# Patient Record
Sex: FEMALE | Race: WHITE | Employment: PART TIME | URBAN - NONMETROPOLITAN AREA
[De-identification: names, ages, dates, MRNs, and addresses within clinical notes are randomized per-mention and may not be internally consistent; named-entity substitution may affect disease eponyms.]

---

## 2018-01-25 ENCOUNTER — OFFICE VISIT (OUTPATIENT)
Dept: ENT CLINIC | Age: 63
End: 2018-01-25
Payer: COMMERCIAL

## 2018-01-25 VITALS
TEMPERATURE: 97.8 F | BODY MASS INDEX: 27.49 KG/M2 | RESPIRATION RATE: 20 BRPM | SYSTOLIC BLOOD PRESSURE: 110 MMHG | DIASTOLIC BLOOD PRESSURE: 72 MMHG | WEIGHT: 149.4 LBS | HEART RATE: 76 BPM | HEIGHT: 62 IN

## 2018-01-25 DIAGNOSIS — R09.81 NASAL CONGESTION: ICD-10-CM

## 2018-01-25 DIAGNOSIS — Z98.890 S/P SINUS SURGERY: ICD-10-CM

## 2018-01-25 DIAGNOSIS — R44.8 FACIAL PRESSURE: Primary | ICD-10-CM

## 2018-01-25 DIAGNOSIS — J32.4 CHRONIC PANSINUSITIS: ICD-10-CM

## 2018-01-25 DIAGNOSIS — R51.9 PERSISTENT HEADACHES: ICD-10-CM

## 2018-01-25 PROCEDURE — 99203 OFFICE O/P NEW LOW 30 MIN: CPT | Performed by: PHYSICIAN ASSISTANT

## 2018-01-25 RX ORDER — AMOXICILLIN AND CLAVULANATE POTASSIUM 875; 125 MG/1; MG/1
1 TABLET, FILM COATED ORAL 2 TIMES DAILY
Qty: 56 TABLET | Refills: 0 | Status: SHIPPED | OUTPATIENT
Start: 2018-01-25 | End: 2018-02-20 | Stop reason: ALTCHOICE

## 2018-01-25 ASSESSMENT — ENCOUNTER SYMPTOMS
EYE PAIN: 1
TROUBLE SWALLOWING: 1
VOMITING: 0
NAUSEA: 1
ABDOMINAL PAIN: 1
WHEEZING: 0
COUGH: 1
STRIDOR: 0
EYE REDNESS: 1
EYE DISCHARGE: 0
RHINORRHEA: 1
ABDOMINAL DISTENTION: 1
VOICE CHANGE: 0
SINUS PRESSURE: 1
CONSTIPATION: 0
CHEST TIGHTNESS: 0
COLOR CHANGE: 0
DIARRHEA: 1
FACIAL SWELLING: 1
EYE ITCHING: 1
CHOKING: 0
BACK PAIN: 1
SORE THROAT: 1
PHOTOPHOBIA: 1
SHORTNESS OF BREATH: 0
BLOOD IN STOOL: 0
RECTAL PAIN: 0
ANAL BLEEDING: 0
APNEA: 0

## 2018-01-25 NOTE — PROGRESS NOTES
[facility]  [facility]  [facility]  [address] [place] [address] [address] [address]  Dept: [phone]  Dept Fax: [phone]  Loc: [phone]      Dina Villaseñor is a 58 y.o. female who was referred by Jose Bermudez CNP for:  Chief Complaint   Patient presents with    Sinusitis     New patient here for acrute sinus infection. Referred by [facility].    . HPI:     Rolf Bojorquez presents to the clinic with complaints of recurrent sinus infections. She states that she gets 4-5 infections a year. She states that she has had this problem her entire life. She states that she has had 3 sinus surgeries in her lifetime, the last one being in the 1990s. Most recently she has had increasing purulent drainage and facial pressure primarily between her eyes and on her left maxillary side. She has been on over 5 antibiotics in the last 6 moths for this all without any significant relief. She does use Flonase for nasal congestion which does help some. She complains of ongoing headaches which she attributes to her sinuses. She states there are times that she has nasal obstruction but she has learned to breath through her mouth effectively.        Patient Active Problem List   Diagnosis    Diarrhea    Heart burn    Abdominal cramping    History of breast cancer    Dysphagia    Occult blood in stools    Adenomatous polyps     Allergies   Allergen Reactions    Sulfa Antibiotics Hives    Tolectin [Tolmetin Sodium] Hives    Codeine Nausea And Vomiting     Past Medical History:   Diagnosis Date    Breast cancer ([address]     rt    Breast cancer ([address]     Colonoscopy causing post-procedural bleeding 20 years ago    Depression     Dizzy spells     GERD (gastroesophageal reflux disease)     Glaucoma     Headache(784.0)     Hypothyroidism     Night sweats     Osteoarthritis     Sexual problems     Sinus infection     Stroke ([address]     Urinary incontinence      Past Surgical History:   Procedure Laterality Date    BREAST LUMPECTOMY  2/6/05    rt breast    CHOLECYSTECTOMY      COLONOSCOPY      ENDOSCOPY, COLON, DIAGNOSTIC      GALLBLADDER SURGERY      SINUS SURGERY      TONSILLECTOMY      UPPER GASTROINTESTINAL ENDOSCOPY           Subjective:      Review of Systems   Constitutional: Positive for appetite change, chills, diaphoresis and fatigue. Negative for activity change, fever and unexpected weight change. HENT: Positive for dental problem, ear pain, facial swelling, hearing loss, nosebleeds, postnasal drip, rhinorrhea, sinus pressure, sneezing, sore throat and trouble swallowing. Negative for congestion, drooling, ear discharge, mouth sores, tinnitus and voice change. Eyes: Positive for photophobia, pain, redness, itching and visual disturbance. Negative for discharge. Respiratory: Positive for cough. Negative for apnea, choking, chest tightness, shortness of breath, wheezing and stridor. Cardiovascular: Negative for chest pain, palpitations and leg swelling. Gastrointestinal: Positive for abdominal distention, abdominal pain, diarrhea and nausea. Negative for anal bleeding, blood in stool, constipation, rectal pain and vomiting. Endocrine: Positive for heat intolerance, polydipsia and polyuria. Negative for cold intolerance and polyphagia. Genitourinary: Positive for enuresis and frequency. Negative for decreased urine volume, difficulty urinating, dyspareunia, dysuria, flank pain, genital sores, hematuria, menstrual problem, pelvic pain, urgency, vaginal bleeding, vaginal discharge and vaginal pain. Musculoskeletal: Positive for arthralgias, back pain, myalgias, neck pain and neck stiffness. Negative for gait problem and joint swelling. Skin: Negative for color change, pallor, rash and wound. Allergic/Immunologic: Negative for environmental allergies, food allergies and immunocompromised state.    Neurological: Positive for dizziness, light-headedness, numbness and

## 2018-02-19 ENCOUNTER — HOSPITAL ENCOUNTER (OUTPATIENT)
Dept: CT IMAGING | Age: 63
Discharge: HOME | End: 2018-02-19
Payer: COMMERCIAL

## 2018-02-19 DIAGNOSIS — Z98.890 S/P SINUS SURGERY: ICD-10-CM

## 2018-02-19 DIAGNOSIS — R09.81 NASAL CONGESTION: ICD-10-CM

## 2018-02-19 DIAGNOSIS — J32.4 CHRONIC PANSINUSITIS: ICD-10-CM

## 2018-02-19 DIAGNOSIS — R44.8 FACIAL PRESSURE: ICD-10-CM

## 2018-02-19 PROCEDURE — 70486 CT MAXILLOFACIAL W/O DYE: CPT

## 2018-02-20 ENCOUNTER — TELEPHONE (OUTPATIENT)
Dept: ENT CLINIC | Age: 63
End: 2018-02-20

## 2018-02-20 ENCOUNTER — OFFICE VISIT (OUTPATIENT)
Dept: ENT CLINIC | Age: 63
End: 2018-02-20
Payer: COMMERCIAL

## 2018-02-20 VITALS
DIASTOLIC BLOOD PRESSURE: 64 MMHG | HEART RATE: 72 BPM | WEIGHT: 149.9 LBS | SYSTOLIC BLOOD PRESSURE: 108 MMHG | BODY MASS INDEX: 27.58 KG/M2 | TEMPERATURE: 98 F | HEIGHT: 62 IN | RESPIRATION RATE: 16 BRPM

## 2018-02-20 DIAGNOSIS — J32.0 CHRONIC MAXILLARY SINUSITIS: ICD-10-CM

## 2018-02-20 DIAGNOSIS — J34.89 EMPTY NOSE SYNDROME: Primary | ICD-10-CM

## 2018-02-20 DIAGNOSIS — R44.8 FACIAL PRESSURE: ICD-10-CM

## 2018-02-20 DIAGNOSIS — H92.03 REFERRED OTALGIA OF BOTH EARS: ICD-10-CM

## 2018-02-20 DIAGNOSIS — K21.9 GASTROESOPHAGEAL REFLUX DISEASE WITHOUT ESOPHAGITIS: ICD-10-CM

## 2018-02-20 DIAGNOSIS — R09.81 NASAL CONGESTION: ICD-10-CM

## 2018-02-20 DIAGNOSIS — G90.01 CAROTIDYNIA: ICD-10-CM

## 2018-02-20 DIAGNOSIS — R51.9 PERSISTENT HEADACHES: ICD-10-CM

## 2018-02-20 DIAGNOSIS — Z98.890 S/P SINUS SURGERY: ICD-10-CM

## 2018-02-20 DIAGNOSIS — M26.629 TMJ SYNDROME: ICD-10-CM

## 2018-02-20 PROCEDURE — 99213 OFFICE O/P EST LOW 20 MIN: CPT | Performed by: OTOLARYNGOLOGY

## 2018-02-20 RX ORDER — NAPROXEN 500 MG/1
500 TABLET ORAL 2 TIMES DAILY WITH MEALS
Qty: 60 TABLET | Refills: 3 | Status: SHIPPED | OUTPATIENT
Start: 2018-02-20 | End: 2020-06-23 | Stop reason: ALTCHOICE

## 2018-02-20 ASSESSMENT — ENCOUNTER SYMPTOMS
SHORTNESS OF BREATH: 0
SORE THROAT: 0
VOMITING: 0
NAUSEA: 0
COUGH: 0
DIARRHEA: 0
VOICE CHANGE: 0
COLOR CHANGE: 0
TROUBLE SWALLOWING: 0
ABDOMINAL PAIN: 0
CHEST TIGHTNESS: 0
STRIDOR: 0
APNEA: 0
WHEEZING: 0
FACIAL SWELLING: 0
CHOKING: 0
SINUS PRESSURE: 0
RHINORRHEA: 0

## 2018-02-20 NOTE — TELEPHONE ENCOUNTER
Dr. Lucretia Cbob wanted to know if patient has ever had an allergy to aspirin or any other NSAID. Called patient and she stated that she hasn't had an allergy to aspirin or any other NSAID,. Informed Dr. Lucretia Cobb and he sent in naproxen for patient.

## 2018-02-20 NOTE — PROGRESS NOTES
nosebleeds, postnasal drip, rhinorrhea, sinus pressure, sneezing, sore throat, tinnitus, trouble swallowing and voice change. Eyes: Negative for visual disturbance. Respiratory: Negative for apnea, cough, choking, chest tightness, shortness of breath, wheezing and stridor. Cardiovascular: Negative for chest pain, palpitations and leg swelling. Gastrointestinal: Negative for abdominal pain, diarrhea, nausea and vomiting. Endocrine: Negative for cold intolerance, heat intolerance, polydipsia and polyuria. Genitourinary: Negative for dysuria, enuresis and hematuria. Musculoskeletal: Negative for arthralgias, gait problem, neck pain and neck stiffness. Skin: Negative for color change and rash. Allergic/Immunologic: Negative for environmental allergies, food allergies and immunocompromised state. Neurological: Negative for dizziness, syncope, facial asymmetry, speech difficulty, light-headedness and headaches. Hematological: Negative for adenopathy. Does not bruise/bleed easily. Psychiatric/Behavioral: Negative for confusion and sleep disturbance. The patient is not nervous/anxious. Objective:     /64 (Site: Left Arm, Position: Sitting)   Pulse 72   Temp 98 °F (36.7 °C) (Oral)   Resp 16   Ht 5' 2\" (1.575 m)   Wt 149 lb 14.4 oz (68 kg)   BMI 27.42 kg/m²     Physical Exam   Constitutional: She is oriented to person, place, and time. She appears well-developed and well-nourished. She is cooperative. HENT:   Head: Normocephalic and atraumatic. Head is without laceration. Right Ear: Hearing, tympanic membrane, external ear and ear canal normal. No drainage or swelling. Tympanic membrane is not scarred, not perforated and not erythematous. Tympanic membrane mobility is normal (on pneumatic massage). No middle ear effusion. Left Ear: Hearing, tympanic membrane, external ear and ear canal normal. No drainage or swelling.  Tympanic membrane is not scarred, not perforated and not erythematous. Tympanic membrane mobility is normal (on pneumatic massage). No middle ear effusion. Nose: Nose normal. No mucosal edema, rhinorrhea or septal deviation. Mouth/Throat: Uvula is midline, oropharynx is clear and moist and mucous membranes are normal. Mucous membranes are not pale and not dry. No oral lesions. No uvula swelling. No oropharyngeal exudate, posterior oropharyngeal edema or posterior oropharyngeal erythema. Turbinates: normal  LIps: lips normal    Teeth and gums: [place] Incisors present and the rest of been removed. Temporomandibular joints reveal no excursion on either side when opening her mouth. Significant pain to palpation right TMJ. Mallampati 1  Tonsils:Unremarkable  Base of tongue: symmetric,  Lingual tonsils: healthy  Valleculae unremarkable  Pharyngeal walls and pyriform sinuses: No lesions or pooling  Larynx, mirror exam: Supraglottis, false and true vocal cord were normal with normal mobility Arytenoid erythema: Present  Arytenoid edema: Absent        Eyes: Right eye exhibits normal extraocular motion and no nystagmus. Left eye exhibits normal extraocular motion and no nystagmus. Conjugate gaze   Neck: Trachea normal and phonation normal. Neck supple. No spinous process tenderness present. No tracheal deviation present. No thyroid mass and no thyromegaly present. No adenopathy. Salivary glands not enlarged and normal to palpation     Cardiovascular:   No murmur heard. Pulmonary/Chest: Breath sounds normal. No stridor. Neurological: She is alert and oriented to person, place, and time. No cranial nerve deficit (VIIth N function intact bilat). Psychiatric: She has a normal mood and affect. Nursing note and vitals reviewed. Data:  All of the past medical history, past surgical history, family history, social history, allergies and current medications were reviewed with the patient.      Assessment & Plan   Diagnoses and all orders for this

## 2018-05-01 ENCOUNTER — OFFICE VISIT (OUTPATIENT)
Dept: ENT CLINIC | Age: 63
End: 2018-05-01
Payer: COMMERCIAL

## 2018-05-01 VITALS
SYSTOLIC BLOOD PRESSURE: 100 MMHG | HEIGHT: 62 IN | WEIGHT: 149.9 LBS | HEART RATE: 84 BPM | TEMPERATURE: 97.8 F | BODY MASS INDEX: 27.58 KG/M2 | RESPIRATION RATE: 12 BRPM | DIASTOLIC BLOOD PRESSURE: 60 MMHG

## 2018-05-01 DIAGNOSIS — K21.9 GASTROESOPHAGEAL REFLUX DISEASE WITHOUT ESOPHAGITIS: Primary | ICD-10-CM

## 2018-05-01 DIAGNOSIS — M26.629 TMJ SYNDROME: ICD-10-CM

## 2018-05-01 DIAGNOSIS — J34.89 EMPTY NOSE SYNDROME: ICD-10-CM

## 2018-05-01 DIAGNOSIS — G90.01 CAROTIDYNIA: ICD-10-CM

## 2018-05-01 PROCEDURE — 99213 OFFICE O/P EST LOW 20 MIN: CPT | Performed by: OTOLARYNGOLOGY

## 2018-05-01 PROCEDURE — 31575 DIAGNOSTIC LARYNGOSCOPY: CPT | Performed by: OTOLARYNGOLOGY

## 2018-05-01 ASSESSMENT — ENCOUNTER SYMPTOMS
DIARRHEA: 0
NAUSEA: 0
SINUS PRESSURE: 0
RHINORRHEA: 0
ABDOMINAL PAIN: 0
WHEEZING: 0
SORE THROAT: 0
STRIDOR: 0
APNEA: 0
TROUBLE SWALLOWING: 0
SHORTNESS OF BREATH: 0
CHOKING: 0
CHEST TIGHTNESS: 0
COUGH: 0
COLOR CHANGE: 0
FACIAL SWELLING: 0
VOICE CHANGE: 0
VOMITING: 0

## 2018-05-22 ENCOUNTER — HOSPITAL ENCOUNTER (OUTPATIENT)
Dept: CT IMAGING | Age: 63
Discharge: HOME | End: 2018-05-22
Payer: COMMERCIAL

## 2018-05-22 DIAGNOSIS — R10.819 LOWER ABDOMINAL TENDERNESS: ICD-10-CM

## 2018-05-22 DIAGNOSIS — R10.31 RLQ ABDOMINAL PAIN: ICD-10-CM

## 2018-05-22 DIAGNOSIS — Z87.898 HISTORY OF DIARRHEA: ICD-10-CM

## 2018-05-22 LAB — POC CREATININE WHOLE BLOOD: 0.7 MG/DL (ref 0.5–1.2)

## 2018-05-22 PROCEDURE — 6360000004 HC RX CONTRAST MEDICATION: Performed by: NURSE PRACTITIONER

## 2018-05-22 PROCEDURE — 74177 CT ABD & PELVIS W/CONTRAST: CPT

## 2018-05-22 PROCEDURE — 82565 ASSAY OF CREATININE: CPT

## 2018-05-22 RX ADMIN — IOHEXOL 50 ML: 240 INJECTION, SOLUTION INTRATHECAL; INTRAVASCULAR; INTRAVENOUS; ORAL at 08:23

## 2018-05-22 RX ADMIN — IOPAMIDOL 85 ML: 755 INJECTION, SOLUTION INTRAVENOUS at 08:24

## 2018-12-17 ENCOUNTER — HOSPITAL ENCOUNTER (OUTPATIENT)
Dept: WOMENS IMAGING | Age: 63
Discharge: HOME | End: 2018-12-17
Payer: COMMERCIAL

## 2018-12-17 ENCOUNTER — HOSPITAL ENCOUNTER (OUTPATIENT)
Dept: MAMMOGRAPHY | Age: 63
Discharge: HOME | End: 2018-12-17
Payer: COMMERCIAL

## 2018-12-17 DIAGNOSIS — Z12.31 VISIT FOR SCREENING MAMMOGRAM: ICD-10-CM

## 2018-12-17 PROCEDURE — 3209999900 MAM COMPARISON OF OUTSIDE IMAGES

## 2018-12-17 PROCEDURE — 77067 SCR MAMMO BI INCL CAD: CPT

## 2020-07-20 ENCOUNTER — HOSPITAL ENCOUNTER (OUTPATIENT)
Age: 65
Discharge: HOME | End: 2020-07-20
Payer: COMMERCIAL

## 2020-07-20 PROCEDURE — U0002 COVID-19 LAB TEST NON-CDC: HCPCS

## 2020-07-22 LAB
PERFORMING LAB: NORMAL
REPORT: NORMAL
SARS-COV-2: NOT DETECTED

## 2021-02-15 ENCOUNTER — TELEPHONE (OUTPATIENT)
Dept: ENT CLINIC | Age: 66
End: 2021-02-15

## 2021-02-15 NOTE — TELEPHONE ENCOUNTER
The patient called to reschedule 2/16/21 appointment to 2/23/21 due to the weather. Patient c/o ear pain, ear burning. She was seen previously by Dr. Jamia Foster 5/01/2018.

## 2021-02-23 ENCOUNTER — OFFICE VISIT (OUTPATIENT)
Dept: ENT CLINIC | Age: 66
End: 2021-02-23
Payer: MEDICARE

## 2021-02-23 VITALS
SYSTOLIC BLOOD PRESSURE: 102 MMHG | TEMPERATURE: 98.4 F | BODY MASS INDEX: 28.71 KG/M2 | HEIGHT: 62 IN | DIASTOLIC BLOOD PRESSURE: 68 MMHG | HEART RATE: 76 BPM | WEIGHT: 156 LBS | RESPIRATION RATE: 14 BRPM

## 2021-02-23 DIAGNOSIS — J34.89 EMPTY NOSE SYNDROME: ICD-10-CM

## 2021-02-23 DIAGNOSIS — H92.01 REFERRED OTALGIA OF RIGHT EAR: ICD-10-CM

## 2021-02-23 DIAGNOSIS — M26.621 ARTHRALGIA OF RIGHT TEMPOROMANDIBULAR JOINT: ICD-10-CM

## 2021-02-23 DIAGNOSIS — A88.1 EPIDEMIC VERTIGO: ICD-10-CM

## 2021-02-23 PROCEDURE — 99214 OFFICE O/P EST MOD 30 MIN: CPT | Performed by: OTOLARYNGOLOGY

## 2021-02-23 ASSESSMENT — ENCOUNTER SYMPTOMS
COUGH: 0
SINUS PAIN: 1
SHORTNESS OF BREATH: 0
CHEST TIGHTNESS: 0
TROUBLE SWALLOWING: 0
DIARRHEA: 0
RHINORRHEA: 0
COLOR CHANGE: 0
SORE THROAT: 0
NAUSEA: 0
STRIDOR: 0
SINUS PRESSURE: 1
APNEA: 0
VOMITING: 0
VOICE CHANGE: 0
WHEEZING: 0
CHOKING: 0
FACIAL SWELLING: 0
ABDOMINAL PAIN: 0

## 2021-02-23 NOTE — PROGRESS NOTES
[facility] [place] [facility]  [facility]  [facility] [place]  Dept: [phone]  Dept Fax: [phone]  Loc: [phone]    Shanell Fisher is a 72 y.o. female who was referred byNo ref. provider found for:  Chief Complaint   Patient presents with    Follow-up     Patient is here for right ear and sinus issues. Vonzella Goodpasture HPI:     Shanell Fisher is a 72 y.o. female who presents today for an episode of vertigo lasting about three days, 13 months ago. She had some tracking issues with her eyes for an hour last month. NO other issues with balance. Right ear pain. Crusting in nose. Her hearing in her right ear is so poor, she cannot hear on the phone. She has had sinus surgery and has a chronic stuffy nose. History: Allergies   Allergen Reactions    Sulfa Antibiotics Hives    Tolectin [Tolmetin Sodium] Hives    Codeine Nausea And Vomiting     Current Outpatient Medications   Medication Sig Dispense Refill    pantoprazole (PROTONIX) 40 MG tablet Take 40 mg by mouth daily      levothyroxine (SYNTHROID) 50 MCG tablet Take 50 mcg by mouth Daily      meclizine (ANTIVERT) 12.5 MG tablet Take by mouth      Progesterone 40 % CREA APPLY 0.5ML TOPICALLY TO THE INNER WRIST/ UPPER BUTTOCK TWICE DAILY      acetaminophen (TYLENOL) 325 MG tablet Take 650 mg by mouth every 6 hours as needed. No current facility-administered medications for this visit.       Past Medical History:   Diagnosis Date    [facility]     rt    [facility] ([facility]     Colonoscopy causing post-procedural bleeding 20 years ago    Depression     Dizzy spells     GERD (gastroesophageal reflux disease)     Glaucoma     Headache(784.0)     Hypothyroidism     Night sweats     Osteoarthritis     Sexual problems     Sinus infection     Stroke ([facility]     Urinary incontinence       Past Surgical History:   Procedure Laterality Date    BREAST LUMPECTOMY  2/6/05    rt breast    CHOLECYSTECTOMY      COLONOSCOPY      COLONOSCOPY  07/02/2018    EGD  07/02/2018    ENDOSCOPY, COLON, DIAGNOSTIC  07/23/2020    GALLBLADDER SURGERY      SINUS SURGERY      TONSILLECTOMY      UPPER GASTROINTESTINAL ENDOSCOPY  2017    polyps removed (16 removed) some were precancerous     Family History   Problem Relation Age of Onset    Diabetes Mother     Heart Disease Mother     Kidney Disease Mother     Other Father         gunshot    Cancer Sister 48        pancreatic    Breast Cancer Sister 46    Colon Cancer Neg Hx      Social History     Tobacco Use    Smoking status: Never Smoker    Smokeless tobacco: Never Used   Substance Use Topics    Alcohol use: No     Alcohol/week: 0.0 standard drinks       Subjective:      Review of Systems   Constitutional: Negative for activity change, appetite change, chills, diaphoresis, fatigue, fever and unexpected weight change. HENT: Positive for ear pain, sinus pressure and sinus pain. Negative for congestion, dental problem, ear discharge, facial swelling, hearing loss, mouth sores, nosebleeds, postnasal drip, rhinorrhea, sneezing, sore throat, tinnitus, trouble swallowing and voice change. Eyes: Negative for visual disturbance. Respiratory: Negative for apnea, cough, choking, chest tightness, shortness of breath, wheezing and stridor. Cardiovascular: Negative for chest pain, palpitations and leg swelling. Gastrointestinal: Negative for abdominal pain, diarrhea, nausea and vomiting. Endocrine: Negative for cold intolerance, heat intolerance, polydipsia and polyuria. Genitourinary: Negative for dysuria, enuresis and hematuria. Musculoskeletal: Negative for arthralgias, gait problem, neck pain and neck stiffness. Skin: Negative for color change and rash. Allergic/Immunologic: Negative for environmental allergies, food allergies and immunocompromised state.    Neurological: Negative for dizziness, syncope, facial asymmetry, speech difficulty, light-headedness and headaches. Hematological: Negative for adenopathy. Does not bruise/bleed easily. Psychiatric/Behavioral: Negative for confusion and sleep disturbance. The patient is not nervous/anxious. Objective:   /68 (Site: Left Upper Arm, Position: Sitting)   Pulse 76   Temp 98.4 °F (36.9 °C) (Infrared)   Resp 14   Ht 5' 2\" (1.575 m)   Wt 156 lb (70.8 kg)   BMI 28.53 kg/m²     Physical Exam  Vitals signs and nursing note reviewed. Constitutional:       Appearance: She is well-developed. HENT:      Head: Normocephalic and atraumatic. No laceration. Comments:        Right Ear: Hearing, ear canal and external ear normal. No drainage or swelling. No middle ear effusion. Tympanic membrane is not perforated or erythematous. Left Ear: Hearing, tympanic membrane, ear canal and external ear normal. No drainage or swelling. No middle ear effusion. Tympanic membrane is not perforated or erythematous. Nose: Nose normal. No septal deviation, mucosal edema or rhinorrhea. Comments: [place] patent nasal fossa, surgically absent turbinates     Mouth/Throat:      Mouth: Mucous membranes are not pale and not dry. No oral lesions. Pharynx: Oropharynx is clear. Uvula midline. No oropharyngeal exudate or posterior oropharyngeal erythema. Comments: LIps: lips normal     Mallampati 1  Base of tongue: symmetric,  Eyes:      Extraocular Movements:      Right eye: Normal extraocular motion and no nystagmus. Left eye: Normal extraocular motion and no nystagmus. Comments: Conjugate gaze   Neck:      Musculoskeletal: Neck supple. Thyroid: No thyroid mass or thyromegaly. Trachea: Phonation normal. No tracheal deviation. Comments:   Salivary glands not enlarged and normal to palpation    Pulmonary:      Effort: Pulmonary effort is normal. No retractions. Breath sounds: No stridor.    Neurological:      Mental Status: She is alert and oriented to person, place, and time. Cranial Nerves: No cranial nerve deficit (VIIth N function intact bilat). Psychiatric:         Mood and Affect: Mood and affect normal.         Behavior: Behavior is cooperative. Data:  All of the past medical history, past surgical history, family history,social history, allergies and current medications were reviewed with the patient. Assessment & Plan   Diagnoses and all orders for this visit:     Diagnosis Orders   1. Asymmetrical hearing loss of right ear  Audiometry with tympanometry   2. Epidemic vertigo  Audiometry with tympanometry   3. Arthralgia of right temporomandibular joint     4. Referred otalgia of right ear     5. Empty nose syndrome         The findings were explained and her questions were answered. We will get hearing test next available opportunity and review that before ordering an MRI of her posterior fossa and IACs with contrast.    Recommend buffered saline irrigations with the [employer] system, with nasal gel throughout the day to minimize dryness. Return for Audiol review. Bertha Monday. Orlando Jones MD    **This report has been created using voice recognition software. It may contain minor errors which are inherent in voicerecognition technology. **

## 2021-04-06 ENCOUNTER — OFFICE VISIT (OUTPATIENT)
Dept: ENT CLINIC | Age: 66
End: 2021-04-06
Payer: MEDICARE

## 2021-04-06 ENCOUNTER — HOSPITAL ENCOUNTER (OUTPATIENT)
Dept: AUDIOLOGY | Age: 66
Discharge: HOME | End: 2021-04-06
Payer: MEDICARE

## 2021-04-06 VITALS
WEIGHT: 155 LBS | TEMPERATURE: 97.2 F | SYSTOLIC BLOOD PRESSURE: 110 MMHG | RESPIRATION RATE: 14 BRPM | BODY MASS INDEX: 28.35 KG/M2 | HEART RATE: 70 BPM | DIASTOLIC BLOOD PRESSURE: 76 MMHG

## 2021-04-06 DIAGNOSIS — H81.90 EPISODIC RECURRENT VERTIGO: ICD-10-CM

## 2021-04-06 PROCEDURE — 92567 TYMPANOMETRY: CPT | Performed by: AUDIOLOGIST

## 2021-04-06 PROCEDURE — 99214 OFFICE O/P EST MOD 30 MIN: CPT | Performed by: OTOLARYNGOLOGY

## 2021-04-06 PROCEDURE — 92557 COMPREHENSIVE HEARING TEST: CPT | Performed by: AUDIOLOGIST

## 2021-04-06 RX ORDER — BETAMETHASONE DIPROPIONATE 0.5 MG/G
CREAM TOPICAL
COMMUNITY
Start: 2021-03-22 | End: 2021-11-30 | Stop reason: ALTCHOICE

## 2021-04-06 ASSESSMENT — ENCOUNTER SYMPTOMS
SHORTNESS OF BREATH: 0
WHEEZING: 0
RHINORRHEA: 0
FACIAL SWELLING: 0
VOICE CHANGE: 0
DIARRHEA: 0
SINUS PRESSURE: 0
NAUSEA: 0
SORE THROAT: 0
CHOKING: 0
TROUBLE SWALLOWING: 0
STRIDOR: 0
CHEST TIGHTNESS: 0
COLOR CHANGE: 0
COUGH: 0
APNEA: 0
ABDOMINAL PAIN: 0
VOMITING: 0

## 2021-04-06 NOTE — PROGRESS NOTES
[facility] [place] [facility]  [facility]  [facility] [place]  Dept: [phone]  Dept Fax: [phone]  Loc: [phone]    Blair Jordan is a 77 y.o. female who was referred byNo ref. provider found for:  Chief Complaint   Patient presents with    Follow-up     Patient is here for same day audio and results   . HPI:     Blair Jordan is a 77 y.o. female who presents today for follow-up on her audiogram.  Middle ear pressures were normal but there was reduced compliance in the left ear. There is high-frequency loss in the left ear that is unexplained. Word discrimination is somewhat impaired on the left side compared to the right. She has episodic vertigo    History: Allergies   Allergen Reactions    Sulfa Antibiotics Hives    Tolectin [Tolmetin Sodium] Hives    Codeine Nausea And Vomiting     Current Outpatient Medications   Medication Sig Dispense Refill    betamethasone dipropionate (DIPROLENE) 0.05 % cream APPLY TOPICALLY ONCE DAILY FOR 14 DAYS      meclizine (ANTIVERT) 12.5 MG tablet Take by mouth      pantoprazole (PROTONIX) 40 MG tablet Take 40 mg by mouth daily      Progesterone 40 % CREA APPLY 0.5ML TOPICALLY TO THE INNER WRIST/ UPPER BUTTOCK TWICE DAILY      levothyroxine (SYNTHROID) 50 MCG tablet Take 50 mcg by mouth Daily      acetaminophen (TYLENOL) 325 MG tablet Take 650 mg by mouth every 6 hours as needed. No current facility-administered medications for this visit.       Past Medical History:   Diagnosis Date    [facility] [address] ([facility]     rt    Breast cancer ([facility]     Colonoscopy causing post-procedural bleeding 20 years ago    Depression     Dizzy spells     GERD (gastroesophageal reflux disease)     Glaucoma     Headache(784.0)     Hypothyroidism     Night sweats     Osteoarthritis     Sexual problems     Sinus infection     Stroke ([facility]     Urinary incontinence       Past Surgical History:   Procedure Laterality Date    BREAST LUMPECTOMY  2/6/05    rt breast    CHOLECYSTECTOMY      COLONOSCOPY      COLONOSCOPY  07/02/2018    EGD  07/02/2018    ENDOSCOPY, COLON, DIAGNOSTIC  07/23/2020    GALLBLADDER SURGERY      SINUS SURGERY      TONSILLECTOMY      UPPER GASTROINTESTINAL ENDOSCOPY  2017    polyps removed (16 removed) some were precancerous     Family History   Problem Relation Age of Onset    Diabetes Mother     Heart Disease Mother     Kidney Disease Mother     Other Father         gunshot    Cancer Sister 48        pancreatic    Breast Cancer Sister 46    Colon Cancer Neg Hx      Social History     Tobacco Use    Smoking status: Never Smoker    Smokeless tobacco: Never Used   Substance Use Topics    Alcohol use: No     Alcohol/week: 0.0 standard drinks       Subjective:      Review of Systems   Constitutional: Negative for activity change, appetite change, chills, diaphoresis, fatigue, fever and unexpected weight change. HENT: Positive for ear pain (RT ear) and hearing loss. Negative for congestion, dental problem, ear discharge, facial swelling, mouth sores, nosebleeds, postnasal drip, rhinorrhea, sinus pressure, sneezing, sore throat, tinnitus, trouble swallowing and voice change. Eyes: Negative for visual disturbance. Respiratory: Negative for apnea, cough, choking, chest tightness, shortness of breath, wheezing and stridor. Cardiovascular: Negative for chest pain, palpitations and leg swelling. Gastrointestinal: Negative for abdominal pain, diarrhea, nausea and vomiting. Endocrine: Negative for cold intolerance, heat intolerance, polydipsia and polyuria. Genitourinary: Negative for dysuria, enuresis and hematuria. Musculoskeletal: Negative for arthralgias, gait problem, neck pain and neck stiffness. Skin: Negative for color change and rash. Allergic/Immunologic: Negative for environmental allergies, food allergies and immunocompromised state. Neurological: Negative for dizziness, syncope, facial asymmetry, speech difficulty, light-headedness and headaches. Hematological: Negative for adenopathy. Does not bruise/bleed easily. Psychiatric/Behavioral: Negative for confusion and sleep disturbance. The patient is not nervous/anxious. All other systems reviewed and are negative. Objective:   /76 (Site: Left Upper Arm, Position: Sitting)   Pulse 70   Temp 97.2 °F (36.2 °C) (Infrared)   Resp 14   Wt 155 lb (70.3 kg)   BMI 28.35 kg/m²     Physical Exam    Data:  All of the past medical history, past surgical history, family history,social history, allergies and current medications were reviewed with the patient. Assessment & Plan   Diagnoses and all orders for this visit:     Diagnosis Orders   1. Asymmetrical left sensorineural hearing loss  MRI BRAIN W WO CONTRAST   2. Episodic recurrent vertigo  MRI BRAIN W WO CONTRAST       The findings were explained and her questions were answered. All the findings could be explained by a single retrocochlear lesion. Discussed options with a repeat 6-month interval audiogram and then an MRI of the IACs if the hearing loss progressed, or simply proceeding with the MRI. Patient chose the latter. Martha Ospina. Leela Villavicencio MD    **This report has been created using voice recognition software. It may contain minor errors which are inherent in voicerecognition technology. **

## 2021-04-19 ENCOUNTER — HOSPITAL ENCOUNTER (OUTPATIENT)
Dept: MRI IMAGING | Age: 66
Discharge: HOME | End: 2021-04-19
Payer: MEDICARE

## 2021-04-19 DIAGNOSIS — H81.90 EPISODIC RECURRENT VERTIGO: ICD-10-CM

## 2021-04-19 LAB — POC CREATININE WHOLE BLOOD: 0.8 MG/DL (ref 0.5–1.2)

## 2021-04-19 PROCEDURE — 6360000004 HC RX CONTRAST MEDICATION: Performed by: OTOLARYNGOLOGY

## 2021-04-19 PROCEDURE — 70553 MRI BRAIN STEM W/O & W/DYE: CPT

## 2021-04-19 PROCEDURE — 82565 ASSAY OF CREATININE: CPT

## 2021-04-19 PROCEDURE — A9579 GAD-BASE MR CONTRAST NOS,1ML: HCPCS | Performed by: OTOLARYNGOLOGY

## 2021-04-19 RX ADMIN — GADOTERIDOL 15 ML: 279.3 INJECTION, SOLUTION INTRAVENOUS at 14:35

## 2021-04-23 ENCOUNTER — OFFICE VISIT (OUTPATIENT)
Dept: ENT CLINIC | Age: 66
End: 2021-04-23
Payer: MEDICARE

## 2021-04-23 VITALS
HEART RATE: 80 BPM | HEIGHT: 62 IN | SYSTOLIC BLOOD PRESSURE: 110 MMHG | BODY MASS INDEX: 29.46 KG/M2 | DIASTOLIC BLOOD PRESSURE: 62 MMHG | WEIGHT: 160.1 LBS | RESPIRATION RATE: 14 BRPM | TEMPERATURE: 98.2 F

## 2021-04-23 DIAGNOSIS — H81.90 EPISODIC RECURRENT VERTIGO: Primary | ICD-10-CM

## 2021-04-23 DIAGNOSIS — J34.89 EMPTY NOSE SYNDROME: ICD-10-CM

## 2021-04-23 DIAGNOSIS — H92.01 REFERRED OTALGIA OF RIGHT EAR: ICD-10-CM

## 2021-04-23 PROCEDURE — 99213 OFFICE O/P EST LOW 20 MIN: CPT | Performed by: OTOLARYNGOLOGY

## 2021-04-23 ASSESSMENT — ENCOUNTER SYMPTOMS
VOICE CHANGE: 0
NAUSEA: 0
SHORTNESS OF BREATH: 0
COUGH: 0
APNEA: 0
COLOR CHANGE: 0
WHEEZING: 0
CHOKING: 0
FACIAL SWELLING: 0
SINUS PRESSURE: 0
CHEST TIGHTNESS: 0
SORE THROAT: 0
STRIDOR: 0
DIARRHEA: 0
RHINORRHEA: 0
VOMITING: 0
TROUBLE SWALLOWING: 0
ABDOMINAL PAIN: 0
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
## 2021-04-23 NOTE — LETTER
[address] and [address]  Phone: [phone]  Fax: [phone]    Davey Irvin MD        May 9, 2021    Connor Brown, APRN - CNP  [address] [facility] [address]    Patient: Danae Bullock   MR Number: [id number]   YOB: 1955   Date of Visit: 4/23/2021     Dear Connor Brown,    I recently saw your patient, Danae Bullock, regarding her hearing loss and vestibular issues. Her episode of 3 days of vertigo approximately 16 months ago followed by a headache could have been a migraine. She may also have had vestibular neuronitis. She does not have a retrocochlear lesion. Below are the relevant portions of my assessment and plan of care. Assessment & Plan   Diagnoses and all orders for this visit:     Diagnosis Orders   1. Asymmetrical left sensorineural hearing loss  Audiometry with tympanometry   2. Asymmetrical hearing loss of right ear  Audiometry with tympanometry   3. Referred otalgia of right ear     4. Episodic recurrent vertigo  Audiometry with tympanometry   5. Empty nose syndrome         The findings were explained and her questions were answered. The episode 3 years ago could have been a migraine or vestibular neuronitis. With the microvascular disease in her brain, I recommend 1 baby aspirin daily, and 6 month audio/tympanogram.  For overly patent nose, she should perform irrigations and she can use nasogel and Claritin. Can check with her family doctor, if she gets more migraines. 6 month follow up with Audiogram/Tympanogram      If you have questions, please do not hesitate to call me. I look forward to following Vanessa Hayes along with you.     Sincerely,          Davey Irvin MD

## 2021-11-30 ENCOUNTER — OFFICE VISIT (OUTPATIENT)
Dept: ENT CLINIC | Age: 66
End: 2021-11-30
Payer: MEDICARE

## 2021-11-30 ENCOUNTER — HOSPITAL ENCOUNTER (OUTPATIENT)
Dept: AUDIOLOGY | Age: 66
Discharge: HOME | End: 2021-11-30
Payer: MEDICARE

## 2021-11-30 VITALS
HEIGHT: 62 IN | SYSTOLIC BLOOD PRESSURE: 114 MMHG | TEMPERATURE: 96.7 F | HEART RATE: 68 BPM | WEIGHT: 157.6 LBS | DIASTOLIC BLOOD PRESSURE: 62 MMHG | OXYGEN SATURATION: 98 % | RESPIRATION RATE: 14 BRPM | BODY MASS INDEX: 29 KG/M2

## 2021-11-30 DIAGNOSIS — M26.621 ARTHRALGIA OF RIGHT TEMPOROMANDIBULAR JOINT: ICD-10-CM

## 2021-11-30 DIAGNOSIS — H92.01 REFERRED OTALGIA OF RIGHT EAR: ICD-10-CM

## 2021-11-30 DIAGNOSIS — H90.5 HEARING LOSS, SENSORINEURAL, HIGH FREQUENCY, LEFT: Primary | ICD-10-CM

## 2021-11-30 PROCEDURE — 99213 OFFICE O/P EST LOW 20 MIN: CPT | Performed by: OTOLARYNGOLOGY

## 2021-11-30 PROCEDURE — 92567 TYMPANOMETRY: CPT | Performed by: AUDIOLOGIST

## 2021-11-30 PROCEDURE — 92557 COMPREHENSIVE HEARING TEST: CPT | Performed by: AUDIOLOGIST

## 2021-11-30 ASSESSMENT — ENCOUNTER SYMPTOMS
APNEA: 0
CHEST TIGHTNESS: 0
ABDOMINAL PAIN: 1
NAUSEA: 1
VOICE CHANGE: 0
TROUBLE SWALLOWING: 1
STRIDOR: 0
RHINORRHEA: 1
WHEEZING: 0
FACIAL SWELLING: 1
SORE THROAT: 1
CHOKING: 0
DIARRHEA: 1
COUGH: 0
VOMITING: 0
COLOR CHANGE: 0
SINUS PRESSURE: 1
SHORTNESS OF BREATH: 0

## 2021-11-30 NOTE — PROGRESS NOTES
[facility] [place] [facility]  [facility] EAR, NOSE AND THROAT  [facility]  Dept: [phone]  Dept Fax: [phone]  Loc: [phone]    Yessy Echeverria is a 77 y.o. female who was referred byNo ref. provider found for:  Chief Complaint   Patient presents with    Follow-up     Patient is here for follow up after Audiogram. C/o right ear pain    . HPI:     Yessy Echeverria is a 77 y.o. female who presents today for follow-up on her 6-month-interval audiogram.  There was no change. She still has a high-frequency sensorineural hearing loss in the left ear. Her 3-day episode of vertigo is now 22 months ago and she has had no further trouble. AUDIOGRAM                Reliability: Good  Audiometer Used:  GSI-61           COMMENTS: Normal hearing except for a mild to moderate sensorineural hearing loss at 7792-9596 Hz for the left ear. Normal hearing for the right ear. There is an asymmetry at 2635-5268 Hz with the left ear being worse. Speech discrimination ability is good  at 92%for the left ear and excellent at 100% for the right ear. Tympanometry revealed high compliance with normal peak pressure in the right ear and  normal peak pressure and normal middle ear compliance for the left ear. All results today are consistent with previous testing completed 04/06/2021    History: Allergies   Allergen Reactions    Sulfa Antibiotics Hives    Tolectin [Tolmetin Sodium] Hives    Codeine Nausea And Vomiting     Current Outpatient Medications   Medication Sig Dispense Refill    pantoprazole (PROTONIX) 40 MG tablet Take 40 mg by mouth daily      Progesterone 40 % CREA APPLY 0.5ML TOPICALLY TO THE INNER WRIST/ UPPER BUTTOCK TWICE DAILY      levothyroxine (SYNTHROID) 50 MCG tablet Take 50 mcg by mouth Daily      acetaminophen (TYLENOL) 325 MG tablet Take 650 mg by mouth every 6 hours as needed.         meclizine (ANTIVERT) 12.5 MG tablet Take by mouth (Patient not taking: Reported on 11/30/2021)       No current facility-administered medications for this visit. Past Medical History:   Diagnosis Date    Breast cancer ([facility]     rt    Breast cancer ([facility]     Colonoscopy causing post-procedural bleeding 20 years ago    Depression     Dizzy spells     GERD (gastroesophageal reflux disease)     Glaucoma     Headache(784.0)     Hypothyroidism     Night sweats     Osteoarthritis     Sexual problems     Sinus infection     Stroke ([facility]     Urinary incontinence       Past Surgical History:   Procedure Laterality Date    BREAST LUMPECTOMY  2/6/05    rt breast    CHOLECYSTECTOMY      COLONOSCOPY      COLONOSCOPY  07/02/2018    EGD  07/02/2018    ENDOSCOPY, COLON, DIAGNOSTIC  07/23/2020    GALLBLADDER SURGERY      SINUS SURGERY      TONSILLECTOMY      UPPER GASTROINTESTINAL ENDOSCOPY  2017    polyps removed (16 removed) some were precancerous     Family History   Problem Relation Age of Onset    Diabetes Mother     Heart Disease Mother     Kidney Disease Mother     Other Father         gunshot    Cancer Sister 48        pancreatic    Breast Cancer Sister 46    Colon Cancer Neg Hx      Social History     Tobacco Use    Smoking status: Never Smoker    Smokeless tobacco: Never Used   Substance Use Topics    Alcohol use: No     Alcohol/week: 0.0 standard drinks       Subjective:      Review of Systems   Constitutional: Positive for fatigue. Negative for activity change, appetite change, chills, diaphoresis, fever and unexpected weight change. HENT: Positive for ear pain, facial swelling, nosebleeds, postnasal drip, rhinorrhea, sinus pressure, sore throat and trouble swallowing. Negative for congestion, dental problem, ear discharge, hearing loss, mouth sores, sneezing, tinnitus and voice change. Eyes: Negative for visual disturbance.    Respiratory: Negative for apnea, cough, choking, chest tightness, shortness of breath, wheezing and stridor. Cardiovascular: Positive for leg swelling. Negative for chest pain and palpitations. Gastrointestinal: Positive for abdominal pain, diarrhea and nausea. Negative for vomiting. Endocrine: Positive for heat intolerance, polydipsia and polyuria. Negative for cold intolerance. Genitourinary: Positive for enuresis. Negative for dysuria and hematuria. Musculoskeletal: Positive for arthralgias, neck pain and neck stiffness. Negative for gait problem. Skin: Negative for color change and rash. Allergic/Immunologic: Negative for environmental allergies, food allergies and immunocompromised state. Neurological: Positive for dizziness, light-headedness and headaches. Negative for syncope, facial asymmetry and speech difficulty. Hematological: Negative for adenopathy. Does not bruise/bleed easily. Psychiatric/Behavioral: Negative for confusion and sleep disturbance. The patient is not nervous/anxious. Objective:   /62 (Site: Left Upper Arm, Position: Sitting)   Pulse 68   Temp 96.7 °F (35.9 °C) (Infrared)   Resp 14   Ht 5' 2\" (1.575 m)   Wt 157 lb 9.6 oz (71.5 kg)   SpO2 98%   BMI 28.83 kg/m²     Physical Exam   Ears: TMs and canals are clear. Arthralgia right TMJ as before    Data:  All of the past medical history, past surgical history, family history,social history, allergies and current medications were reviewed with the patient. Assessment & Plan   Diagnoses and all orders for this visit:     Diagnosis Orders   1. Hearing loss, sensorineural, high frequency, left     2. Referred otalgia of right ear     3. Arthralgia of right temporomandibular joint         The findings were explained and her questions were answered. Since is no change, she may return as needed. Suggested she call us especially if she gets further noticeable drop in her hearing. She agreed to play it by ear. Return as needed       [employer]  Duane Mae, MD    **This report has been created using voice recognition software. It may contain minor errors which are inherent in voicerecognition technology. **

## 2022-07-18 ENCOUNTER — HOSPITAL ENCOUNTER (OUTPATIENT)
Age: 67
Discharge: HOME | End: 2022-07-18
Payer: MEDICARE

## 2022-07-18 LAB
ALT SERPL-CCNC: 22 U/L (ref 11–66)
CALCIUM SERPL-MCNC: 9.7 MG/DL (ref 8.5–10.5)
MAGNESIUM: 2.2 MG/DL (ref 1.6–2.4)
VITAMIN D 25-HYDROXY: 50 NG/ML (ref 30–100)

## 2022-07-18 PROCEDURE — 84460 ALANINE AMINO (ALT) (SGPT): CPT

## 2022-07-18 PROCEDURE — 82306 VITAMIN D 25 HYDROXY: CPT

## 2022-07-18 PROCEDURE — 82310 ASSAY OF CALCIUM: CPT

## 2022-07-18 PROCEDURE — 36415 COLL VENOUS BLD VENIPUNCTURE: CPT

## 2022-07-18 PROCEDURE — 83735 ASSAY OF MAGNESIUM: CPT
